# Patient Record
Sex: MALE | Race: WHITE | Employment: FULL TIME | URBAN - METROPOLITAN AREA
[De-identification: names, ages, dates, MRNs, and addresses within clinical notes are randomized per-mention and may not be internally consistent; named-entity substitution may affect disease eponyms.]

---

## 2018-12-18 ENCOUNTER — APPOINTMENT (OUTPATIENT)
Dept: CT IMAGING | Facility: HOSPITAL | Age: 43
End: 2018-12-18
Attending: EMERGENCY MEDICINE
Payer: COMMERCIAL

## 2018-12-18 ENCOUNTER — HOSPITAL ENCOUNTER (EMERGENCY)
Facility: HOSPITAL | Age: 43
Discharge: HOME | End: 2018-12-18
Attending: EMERGENCY MEDICINE
Payer: COMMERCIAL

## 2018-12-18 VITALS
HEART RATE: 67 BPM | TEMPERATURE: 98 F | SYSTOLIC BLOOD PRESSURE: 129 MMHG | WEIGHT: 180 LBS | HEIGHT: 66 IN | BODY MASS INDEX: 28.93 KG/M2 | OXYGEN SATURATION: 93 % | DIASTOLIC BLOOD PRESSURE: 96 MMHG | RESPIRATION RATE: 16 BRPM

## 2018-12-18 DIAGNOSIS — R20.2 PARESTHESIAS: Primary | ICD-10-CM

## 2018-12-18 DIAGNOSIS — M54.50 ACUTE LEFT-SIDED LOW BACK PAIN WITHOUT SCIATICA: ICD-10-CM

## 2018-12-18 PROCEDURE — 99284 EMERGENCY DEPT VISIT MOD MDM: CPT | Performed by: EMERGENCY MEDICINE

## 2018-12-18 PROCEDURE — 81003 URINALYSIS AUTO W/O SCOPE: CPT | Performed by: EMERGENCY MEDICINE

## 2018-12-18 PROCEDURE — 70450 CT HEAD/BRAIN W/O DYE: CPT | Performed by: EMERGENCY MEDICINE

## 2018-12-18 NOTE — ED INITIAL ASSESSMENT (HPI)
PT states he has tingling and numbness in L arm and leg with neck pain. PT also c/o lower L back pain two days ago but has subsided. PT denies fevers, N/V/D.

## 2018-12-18 NOTE — ED PROVIDER NOTES
Patient Seen in: [facility] Emergency Department    History   Patient presents with:  Numbness Weakness (neurologic)    Stated Complaint:     HPI    Patient is a 27-year-old male comes emergency room for evaluation of paresthesias.   Patient states f Current:BP (!) 129/96   Pulse 67   Temp 98.3 °F (36.8 °C)   Resp 16   Ht 167.6 cm (5' 6\")   Wt 81.6 kg   SpO2 93%   BMI 29.05 kg/m²         Physical Exam    GENERAL: No acute distress, Well appearing and non-toxic, Alert and oriented X 3   HEENT: No reasonable clinical confidence and prior to discharge, that an emergency medical condition was not or was no longer present. There was no indication for further evaluation, treatment or admission on an emergency basis.   Comprehensive verbal and written di

## 2020-10-06 PROBLEM — E66.9 OBESITY (BMI 30.0-34.9): Status: ACTIVE | Noted: 2020-10-06

## 2021-11-08 PROBLEM — I10 ESSENTIAL HYPERTENSION: Status: ACTIVE | Noted: 2021-11-08

## (undated) NOTE — ED AVS SNAPSHOT
Leontine Boxer   MRN: [id number]    Department:  [facility] Emergency Department   Date of Visit:  12/18/2018           Disclosure     Insurance plans vary and the physician(s) referred by the ER may not be covered by your plan.  Please conta tell this physician (or your personal doctor if your instructions are to return to your personal doctor) about any new or lasting problems. The primary care or specialist physician will see patients referred from the [facility] Emergency Department.  Tommy Taylor